# Patient Record
Sex: FEMALE
[De-identification: names, ages, dates, MRNs, and addresses within clinical notes are randomized per-mention and may not be internally consistent; named-entity substitution may affect disease eponyms.]

---

## 2018-11-07 ENCOUNTER — TELEPHONE (OUTPATIENT)
Dept: CALL CENTER | Age: 53
End: 2018-11-07

## 2018-11-07 NOTE — TELEPHONE ENCOUNTER
52 y/o female calls today to schedule an appointment in the eye clinic.  She developed black dots(floaters) yesterday afternoon. She states it was as if someone was shaking pepper in front of her left eye.  She states when she covered her hand over the right eye the vision was blurry.  She did see her primary doctor who referred her to the eye clinic here.  She states she still sees the black floaters but not as rapidly as yesterday.  She denies pain,redness,swelling,drainage or fever.    Pt informed that a priority message will sent to the eye clinic to try and work her in ASAP.

## 2018-11-07 NOTE — TELEPHONE ENCOUNTER
[place] [facility] [place]-- pt not able to come without referral per financial counselor    Pt stated seen by PCP for referral in original message  No PCP in system and need to verify [facility] provider and referral    Pt able to be seen otherwise, but would need to sign waiver    I called and left message with direct number to review referral further    Note to financial counselor  Brent Garza RN 3:22 PM 11/07/18

## 2018-11-08 NOTE — TELEPHONE ENCOUNTER
Pt left message on triage line at 1900 yesterday  Stated had [facility] writer referral for eye exam    Stated would not be able to pay out of pocket.    I reviewed information with financial counselor and referral needs to be [facility] provider    Left message with pt to contact [facility] eye clinic for exam-- recommend seeing eye MD soon elsewhere if not able to come here.  [facility] phone number to eye clinic left  Reviewed also if able to obtain referral from [facility] provider able to schedule here without waiver.    Direct number provided for callback  Brent Garza RN 8:04 AM 11/08/18
